# Patient Record
Sex: MALE | Race: WHITE | NOT HISPANIC OR LATINO | Employment: FULL TIME | URBAN - NONMETROPOLITAN AREA
[De-identification: names, ages, dates, MRNs, and addresses within clinical notes are randomized per-mention and may not be internally consistent; named-entity substitution may affect disease eponyms.]

---

## 2017-06-19 ENCOUNTER — PROCEDURE VISIT (OUTPATIENT)
Dept: OTOLARYNGOLOGY | Facility: CLINIC | Age: 57
End: 2017-06-19

## 2017-06-19 ENCOUNTER — OFFICE VISIT (OUTPATIENT)
Dept: OTOLARYNGOLOGY | Facility: CLINIC | Age: 57
End: 2017-06-19

## 2017-06-19 VITALS
BODY MASS INDEX: 30.35 KG/M2 | SYSTOLIC BLOOD PRESSURE: 164 MMHG | DIASTOLIC BLOOD PRESSURE: 104 MMHG | RESPIRATION RATE: 16 BRPM | HEIGHT: 70 IN | WEIGHT: 212 LBS | HEART RATE: 85 BPM | TEMPERATURE: 97 F

## 2017-06-19 DIAGNOSIS — H90.5 SNHL (SENSORINEURAL HEARING LOSS): ICD-10-CM

## 2017-06-19 DIAGNOSIS — H69.83 ETD (EUSTACHIAN TUBE DYSFUNCTION), BILATERAL: ICD-10-CM

## 2017-06-19 DIAGNOSIS — H92.01 OTALGIA, RIGHT: Primary | ICD-10-CM

## 2017-06-19 DIAGNOSIS — R49.0 HOARSENESS: ICD-10-CM

## 2017-06-19 DIAGNOSIS — H93.12 TINNITUS, LEFT: ICD-10-CM

## 2017-06-19 DIAGNOSIS — J38.2 VOCAL CORD NODULES: ICD-10-CM

## 2017-06-19 DIAGNOSIS — H90.5 HEARING LOSS, SENSORINEURAL: Primary | ICD-10-CM

## 2017-06-19 PROCEDURE — 92553 AUDIOMETRY AIR & BONE: CPT | Performed by: NURSE PRACTITIONER

## 2017-06-19 PROCEDURE — 92567 TYMPANOMETRY: CPT | Performed by: NURSE PRACTITIONER

## 2017-06-19 PROCEDURE — 31575 DIAGNOSTIC LARYNGOSCOPY: CPT | Performed by: NURSE PRACTITIONER

## 2017-06-19 PROCEDURE — 99203 OFFICE O/P NEW LOW 30 MIN: CPT | Performed by: NURSE PRACTITIONER

## 2017-06-19 RX ORDER — FEXOFENADINE HCL 180 MG/1
180 TABLET ORAL DAILY
COMMUNITY

## 2017-06-19 RX ORDER — ASPIRIN 81 MG/1
81 TABLET ORAL DAILY
COMMUNITY

## 2017-06-19 RX ORDER — OLOPATADINE HYDROCHLORIDE 665 UG/1
2 SPRAY NASAL 2 TIMES DAILY
Qty: 1 BOTTLE | Refills: 5 | Status: SHIPPED | OUTPATIENT
Start: 2017-06-19

## 2017-06-19 RX ORDER — CIPROFLOXACIN AND DEXAMETHASONE 3; 1 MG/ML; MG/ML
3 SUSPENSION/ DROPS AURICULAR (OTIC) 2 TIMES DAILY PRN
Qty: 7.5 ML | Refills: 1 | Status: SHIPPED | OUTPATIENT
Start: 2017-06-19

## 2017-06-19 RX ORDER — FLUTICASONE PROPIONATE 50 MCG
2 SPRAY, SUSPENSION (ML) NASAL DAILY
COMMUNITY

## 2017-06-19 RX ORDER — ATORVASTATIN CALCIUM 40 MG/1
TABLET, FILM COATED ORAL
COMMUNITY
Start: 2017-05-22

## 2017-06-20 NOTE — PATIENT INSTRUCTIONS
Avoid loud noise exposure. Protect hearing in with extreme loud noise.   Recommend fan, sound machine, white noise from TV for tinnitus masking. Avoid excessive caffeine, decrease stress level, monitor BP regularly, routine sleep schedule. Low Sodium Diet.   Tinnitus Handout   Hearing loss handout   Appt with hearing aid specialist for hearing aid fitting when patient so desires.   Call for change or worsening symptoms not controlled by current treatment regimen.        Recheck audio in 6 months, consider MRI

## 2017-06-20 NOTE — PROGRESS NOTES
OPERATIVE NOTE:    PROCEDURE:   Flexible Fiberoptic Laryngoscopy    ANESTHESIA:  None    REASON FOR PROCEDURE:  Procedure was recommend for suspicious clinical behavior unresponsive to medical management.  Risks, benefits and alternatives were discussed.      DETAILS of OPERATION:  The patient was seated in the exam chair.  A flexible fiberoptic laryngoscopy was performed through the oral cavity.  The scope was introduced into the oral cavity and directed to the level of the glottis, examining the structures of the oropharynx, base of tongue, vallecula, supraglottic larynx, glottic larynx, and hypopharynx.      FINDINGS:  Mucosal surfaces:   The mucosal surfaces demonstrated normal mucosa surfaces without inflammation.    Base of tongue:  The base of tongue was found to have no mass or lesion.    Epiglottis:  The epiglottis was found to have no mass or lesion.    Aryepligottic fold:  The AE folds were found to have no mass or lesion.    False Vocal Fold:  The false cords were found to have no mass or lesion.    True Vocal Cord:  The true vocal cords were found to have bilateral nodules  Arytenoid:   The arytenoids were found to have no mass or lesions.    Hypopharynx:  The hypopharynx was found to have no mass or lesion.    The patient tolerated procedure well.

## 2017-06-20 NOTE — PROGRESS NOTES
YOB: 1960  Location: [facility]  Location Address: [address] [place] [address], [facility], [address] [place] [address]  Location Phone: [phone]    Chief Complaint   Patient presents with   • Hearing Loss       History of Present Illness  Mitch Joseph is a 56 y.o. male.  Mitch Joseph is here for evaluation of ENT complaints. The patient has had problems with otalgia  The symptoms are localized to the right ear. The patient has had moderate symptoms. The symptoms have been present for the last several months . The symptoms are aggravated by  no identifiable factors . The symptoms are improved by no identifieable factors.  Otalgia intermittently on right side. Also report hoarseness has been present for quite some time. Denies smoking    Procedure visit     2017  [facility]    Hearing loss, sensorineural +1 more   Dx    Progress Notes   Eden Cordero (Audiologist) • • Audiology                   Past Medical History:   Diagnosis Date   • Coronary artery disease    • Hearing loss    • Hyperlipidemia    • Irritable bowel syndrome    • Tinnitus        Past Surgical History:   Procedure Laterality Date   • FOOT SURGERY Right          Current Outpatient Prescriptions:   •  aspirin 81 MG EC tablet, Take 81 mg by mouth Daily., Disp: , Rfl:   •  atorvastatin (LIPITOR) 40 MG tablet, , Disp: , Rfl:   •  fexofenadine (ALLEGRA) 180 MG tablet, Take 180 mg by mouth Daily., Disp: , Rfl:   •  fluticasone (FLONASE) 50 MCG/ACT nasal spray, 2 sprays into each nostril Daily., Disp: , Rfl:   •  ciprofloxacin-dexamethasone (CIPRODEX) 0.3-0.1 % otic suspension, Administer 3 drops into both ears 2 (Two) Times a Day As Needed (ear pain)., Disp: 7.5 mL, Rfl: 1  •  olopatadine (PATANASE) 0.6 % solution nasal solution, 2 sprays by Each Nare route 2 (Two) Times a Day., Disp: 1 bottle, Rfl: 5    Review of patient's allergies indicates no known allergies.    Family History   Problem Relation Age of Onset   •  Obesity Father    • Stroke Father    • Diabetes Maternal Grandmother        Social History     Social History   • Marital status:      Spouse name: N/A   • Number of children: N/A   • Years of education: N/A     Occupational History   • Not on file.     Social History Main Topics   • Smoking status: Former Smoker   • Smokeless tobacco: Not on file      Comment: quit in 1997   • Alcohol use Yes      Comment: OCCASIONAL    • Drug use: No   • Sexual activity: Not on file     Other Topics Concern   • Not on file     Social History Narrative   • No narrative on file       Review of Systems   Constitutional: Negative.    HENT:        SEE HPI   Eyes: Negative.    Respiratory: Negative.    Cardiovascular: Negative.    Gastrointestinal: Negative.    Endocrine: Negative.    Genitourinary: Negative.    Musculoskeletal: Negative.    Skin: Negative.    Allergic/Immunologic: Negative.    Neurological: Negative.    Hematological: Negative.    Psychiatric/Behavioral: Negative.        Vitals:    06/19/17 1517   BP: (!) 164/104   Pulse: 85   Resp: 16   Temp: 97 °F (36.1 °C)       Objective     Physical Exam  CONSTITUTIONAL: well nourished, alert, oriented, in no acute distress     COMMUNICATION AND VOICE: able to communicate normally, normal voice quality    HEAD: normocephalic, no lesions, atraumatic, no tenderness, no masses     FACE: appearance normal, no lesions, no tenderness, no deformities, facial motion symmetric    SALIVARY GLANDS: parotid glands with no tenderness, no swelling, no masses, submandibular glands with normal size, nontender    EYES: ocular motility normal, eyelids normal, orbits normal, no proptosis, conjunctiva normal , pupils equal, round     EARS:  Hearing: response to conversational voice normal bilaterally   External Ears: auricles without lesions  Otoscopic: tympanic membrane appearance normal, no lesions, no perforation, normal mobility, no fluid    NOSE:  External Nose: structure normal, no  tenderness on palpation, no nasal discharge, no lesions, no evidence of trauma, nostrils patent   Intranasal Exam: nasal mucosa edema, vestibule within normal limits, inferior turbinate normal, nasal septum midline     ORAL:  Lips: upper and lower lips without lesion   Teeth: dentition within normal limits for age   Gums: gingivae healthy   Oral Mucosa: oral mucosa normal, no mucosal lesions   Floor of Mouth: Warthin’s duct patent, mucosa normal  Tongue: lingual mucosa normal without lesions, normal tongue mobility   Palate: soft and hard palates with normal mucosa and structure  Oropharynx: oropharyngeal mucosa normal    Laryngeal exam: see scope    NECK: neck appearance normal, no mass,  noted without erythema or tenderness    THYROID: no overt thyromegaly, no tenderness, nodules or mass present on palpation, position midline     LYMPH NODES: no lymphadenopathy    CHEST/RESPIRATORY: respiratory effort normal    CARDIOVASCULAR: extremities without cyanosis or edema      NEUROLOGIC/PSYCHIATRIC: oriented to time, place and person, mood normal, affect appropriate, CN II-XII intact grossly    Assessment/Plan   Mitch was seen today for hearing loss.    Diagnoses and all orders for this visit:    Otalgia, right    ETD (eustachian tube dysfunction), bilateral    SNHL (sensorineural hearing loss)    Vocal cord nodules    Hoarseness    Other orders  -     ciprofloxacin-dexamethasone (CIPRODEX) 0.3-0.1 % otic suspension; Administer 3 drops into both ears 2 (Two) Times a Day As Needed (ear pain).  -     olopatadine (PATANASE) 0.6 % solution nasal solution; 2 sprays by Each Nare route 2 (Two) Times a Day.      * Surgery not found *  No orders of the defined types were placed in this encounter.    Return in about 6 months (around 12/19/2017).       Patient Instructions   Avoid loud noise exposure. Protect hearing in with extreme loud noise.   Recommend fan, sound machine, white noise from TV for tinnitus masking. Avoid  excessive caffeine, decrease stress level, monitor BP regularly, routine sleep schedule. Low Sodium Diet.   Tinnitus Handout   Hearing loss handout   Appt with hearing aid specialist for hearing aid fitting when patient so desires.   Call for change or worsening symptoms not controlled by current treatment regimen.        Recheck audio in 6 months, consider MRI